# Patient Record
Sex: FEMALE | Race: WHITE | RURAL
[De-identification: names, ages, dates, MRNs, and addresses within clinical notes are randomized per-mention and may not be internally consistent; named-entity substitution may affect disease eponyms.]

---

## 2024-03-11 ENCOUNTER — HOSPITAL ENCOUNTER (EMERGENCY)
Facility: HOSPITAL | Age: 24
Discharge: LEFT BEFORE DISCHARGE | End: 2024-03-11
Attending: EMERGENCY MEDICINE

## 2024-03-11 VITALS
BODY MASS INDEX: 20.49 KG/M2 | HEIGHT: 64 IN | TEMPERATURE: 98.2 F | HEART RATE: 101 BPM | DIASTOLIC BLOOD PRESSURE: 76 MMHG | WEIGHT: 120 LBS | OXYGEN SATURATION: 100 % | RESPIRATION RATE: 16 BRPM | SYSTOLIC BLOOD PRESSURE: 128 MMHG

## 2024-03-11 DIAGNOSIS — Z53.21 PATIENT LEFT WITHOUT BEING SEEN: Primary | ICD-10-CM

## 2024-03-11 ASSESSMENT — LIFESTYLE VARIABLES
HOW MANY STANDARD DRINKS CONTAINING ALCOHOL DO YOU HAVE ON A TYPICAL DAY: PATIENT DOES NOT DRINK
HOW OFTEN DO YOU HAVE A DRINK CONTAINING ALCOHOL: NEVER

## 2024-03-11 ASSESSMENT — PAIN SCALES - GENERAL: PAINLEVEL_OUTOF10: 0

## 2024-03-11 ASSESSMENT — PAIN - FUNCTIONAL ASSESSMENT: PAIN_FUNCTIONAL_ASSESSMENT: 0-10

## 2024-03-11 NOTE — ED PROVIDER NOTES
12:06 PM    I was inadvertently assigned to this patient's treatment team.  I did not see this patient nor did I have any contact with this patient.  I had no involvement during the evaluation, treatment or disposition of this patient.  I am signing off this note to indicate only why my name appeared in the record.  DO James Thurman John E, DO  03/11/24 120

## 2024-03-11 NOTE — ED TRIAGE NOTES
Pt arrived with complaint of nausea.  Pt reports she is about 7 weeks pregnant and has been nauseated and feel like she may be dehydrated, this symptoms have been going on for a week.  Pt is awake alert and oriented X 4,  pt educated on NPO if she is feeling nauseated.  Pt educated on ER flow. This writer apologized for any delay that may occur, pt and/or family educated on acuity of the unit at this time.  Pt placed back in waiting room at this time